# Patient Record
Sex: FEMALE | Race: ASIAN | Employment: FULL TIME | URBAN - METROPOLITAN AREA
[De-identification: names, ages, dates, MRNs, and addresses within clinical notes are randomized per-mention and may not be internally consistent; named-entity substitution may affect disease eponyms.]

---

## 2018-08-28 ENCOUNTER — OFFICE VISIT (OUTPATIENT)
Dept: HEMATOLOGY/ONCOLOGY | Facility: HOSPITAL | Age: 43
End: 2018-08-28
Attending: SPECIALIST
Payer: COMMERCIAL

## 2018-08-28 VITALS
TEMPERATURE: 96 F | RESPIRATION RATE: 18 BRPM | DIASTOLIC BLOOD PRESSURE: 79 MMHG | SYSTOLIC BLOOD PRESSURE: 117 MMHG | OXYGEN SATURATION: 100 % | HEART RATE: 65 BPM | WEIGHT: 98.19 LBS

## 2018-08-28 DIAGNOSIS — Z80.41 FAMILY HISTORY OF OVARIAN CANCER: ICD-10-CM

## 2018-08-28 PROCEDURE — 99245 OFF/OP CONSLTJ NEW/EST HI 55: CPT | Performed by: SPECIALIST

## 2018-08-28 NOTE — PROGRESS NOTES
Patient is here today for Genetic Consult with Patricia Proctor and Pearl Barber. Patient denies pain. Medication list and medical history were reviewed and updated.      Education Record    Learner:  Patient    Disease / Diagnosis: Genetic Consult    Bar

## 2018-08-28 NOTE — PROGRESS NOTES
[facility] Report of Consultation      Patient Name: Fortunato Cai   YOB: 1975  Medical Record Number: [id number]  Consulting Physician: Joe Sandy M.D. Referring Physician: Sheldon Foster M.D.     Date of Consultation: 8/2 Left arm, Patient Position: Sitting, Cuff Size: adult)   Pulse 65   Temp (!) 96.4 °F (35.8 °C) (Tympanic)   Resp 18   Wt 44.5 kg (98 lb 3.2 oz)   SpO2 100%     Physical Examination   Constitutional Well developed and well nourished; in no apparent distress still possible that Ms. Gray Huang has a pathogenic variant in one of these genes that was not detected by the genetic test, or that the family is dealing with a hereditary cancer syndrome involving a different gene. It is also possible that Ms. Monsivais’s relat 75, management should be considered on an individual basis. 4. Consider risk-reducing prophylactic mastectomy.   5. Risk-reducing salpingo-oophorectomy typically between 35-40 years and upon completion of child bearing or individualized based on earliest testing at a reduced cost to clarify their cancer risks. Genetic test results have implications for the entire biological family.  Thus, it is recommended that she share her genetic test results with her biological family members so that they may have their

## 2018-08-28 NOTE — PROGRESS NOTES
Referring Provider: Reji De La Paz MD    Additional Provider: Eunice Connelly MD    Reason for Referral:  Reji Vazquez was referred for genetic counseling because of a family history of ovarian cancer.   Ms. Derrick Samuels is a 43year-old woman of [place] descent Assessment:   Ms. Angelica Cesar reported maternal family history is suspicious for a hereditary cancer predisposition syndrome. She meets NCCN Guidelines for BRCA1/2 testing. I favor proceeding with testing as part of a multigene panel.       Genetic Testing ( deleterious mutation, her children and siblings would each have a 50% chance of carrying the same variant. At-risk adults (>18) would have the option of pursuing targeted genetic testing at a reduced cost to clarify their cancer risks.  Genetic test results

## 2018-09-14 ENCOUNTER — GENETICS ENCOUNTER (OUTPATIENT)
Dept: HEMATOLOGY/ONCOLOGY | Facility: HOSPITAL | Age: 43
End: 2018-09-14

## 2018-09-14 NOTE — PROGRESS NOTES
Referring Provider:       Marisabel Schreiber MD     Additional Provider:     Rey Cordero MD    Reason for Referral:  Gadiel Biswas had genetic testing performed on 8/28/18 because of a family history of ovarian cancer in her mother.      Genetic Testing Resu